# Patient Record
Sex: FEMALE | Race: WHITE | NOT HISPANIC OR LATINO | URBAN - METROPOLITAN AREA
[De-identification: names, ages, dates, MRNs, and addresses within clinical notes are randomized per-mention and may not be internally consistent; named-entity substitution may affect disease eponyms.]

---

## 2020-02-10 ENCOUNTER — OFFICE (OUTPATIENT)
Dept: URBAN - METROPOLITAN AREA CLINIC 78 | Facility: CLINIC | Age: 65
End: 2020-02-10
Payer: COMMERCIAL

## 2020-02-10 VITALS
HEART RATE: 76 BPM | WEIGHT: 257 LBS | HEIGHT: 68 IN | DIASTOLIC BLOOD PRESSURE: 84 MMHG | SYSTOLIC BLOOD PRESSURE: 137 MMHG | TEMPERATURE: 97 F

## 2020-02-10 DIAGNOSIS — N18.9 CHRONIC KIDNEY DISEASE, UNSPECIFIED: ICD-10-CM

## 2020-02-10 DIAGNOSIS — I10 ESSENTIAL (PRIMARY) HYPERTENSION: ICD-10-CM

## 2020-02-10 DIAGNOSIS — K21.9 GASTRO-ESOPHAGEAL REFLUX DISEASE WITHOUT ESOPHAGITIS: ICD-10-CM

## 2020-02-10 PROCEDURE — 99203 OFFICE O/P NEW LOW 30 MIN: CPT

## 2020-02-10 NOTE — SERVICEHPINOTES
ADALGISA SHARPE   is a   64   year old   white female with h/o HTN, CKD, bipolar depression, hypothyroidism who is being seen in consultation at the request of   KAROL NORIEGA   for + iFOBT found by PCP in 01/27/2020 on routine annual physical exam. His prior colonoscopy at age 51 yo that was "normal" per patient report that was done without anesthesia at that time due to being on specific bipolar rx: No longer on that same rx. She has daily BMs, BSS type 4 to 5 predominately. No NSAID use due to CKD that is followed by Dr Estefanía Rangel. Reviewed labs from 11/2019: eGFR 44 and normal CMP and CBC. No known cardiac or pulmonary disease. Denies chest pain, dyspnea, n/v, abdominal pain, change in bowel habits, melena, BRBPR, weight loss. No other GI complaints today.She also has chronic GERD that is well controlled on Pepcid 20 mg daily as evident by rare breakthrough symptoms. Her last EGD was in 04/2004 with Baldwin dilation at that time. BR

## 2022-03-30 ENCOUNTER — OFFICE (OUTPATIENT)
Dept: URBAN - METROPOLITAN AREA CLINIC 34 | Facility: CLINIC | Age: 67
End: 2022-03-30
Payer: COMMERCIAL

## 2022-03-30 VITALS — TEMPERATURE: 98.1 F | WEIGHT: 233 LBS | HEIGHT: 68 IN

## 2022-03-30 DIAGNOSIS — R93.5 ABNORMAL FINDINGS ON DIAGNOSTIC IMAGING OF OTHER ABDOMINAL R: ICD-10-CM

## 2022-03-30 DIAGNOSIS — K86.2 CYST OF PANCREAS: ICD-10-CM

## 2022-03-30 DIAGNOSIS — K21.9 GASTRO-ESOPHAGEAL REFLUX DISEASE WITHOUT ESOPHAGITIS: ICD-10-CM

## 2022-03-30 PROCEDURE — 99214 OFFICE O/P EST MOD 30 MIN: CPT | Performed by: INTERNAL MEDICINE
